# Patient Record
Sex: MALE | Race: WHITE | NOT HISPANIC OR LATINO | Employment: OTHER | URBAN - METROPOLITAN AREA
[De-identification: names, ages, dates, MRNs, and addresses within clinical notes are randomized per-mention and may not be internally consistent; named-entity substitution may affect disease eponyms.]

---

## 2023-10-12 ENCOUNTER — HOSPITAL ENCOUNTER (OUTPATIENT)
Dept: CARDIOLOGY | Facility: HOSPITAL | Age: 88
Discharge: HOME | End: 2023-10-12
Payer: MEDICARE

## 2023-10-12 DIAGNOSIS — F03.918 UNSPECIFIED DEMENTIA, UNSPECIFIED SEVERITY, WITH OTHER BEHAVIORAL DISTURBANCE (MULTI): ICD-10-CM

## 2023-10-12 PROCEDURE — 93010 ELECTROCARDIOGRAM REPORT: CPT | Performed by: INTERNAL MEDICINE

## 2023-10-12 PROCEDURE — 93005 ELECTROCARDIOGRAM TRACING: CPT

## 2023-10-24 LAB
ATRIAL RATE: 64 BPM
P AXIS: 69 DEGREES
P OFFSET: 191 MS
P ONSET: 137 MS
PR INTERVAL: 166 MS
Q ONSET: 220 MS
QRS COUNT: 10 BEATS
QRS DURATION: 76 MS
QT INTERVAL: 404 MS
QTC CALCULATION(BAZETT): 416 MS
QTC FREDERICIA: 412 MS
R AXIS: -40 DEGREES
T AXIS: 74 DEGREES
T OFFSET: 422 MS
VENTRICULAR RATE: 64 BPM

## 2023-11-13 ENCOUNTER — TELEPHONE (OUTPATIENT)
Dept: GERIATRIC MEDICINE | Facility: CLINIC | Age: 88
End: 2023-11-13
Payer: MEDICARE

## 2023-11-14 NOTE — TELEPHONE ENCOUNTER
Spoke with wife and let her know.  She also inquired about the pt's Memantine Rx that cost $200 per 90 days at [facility].  I printed out a Good Rx coupon for her for $32.99 for 90 days and will put in the mail to them.

## 2023-12-04 ENCOUNTER — TELEMEDICINE (OUTPATIENT)
Dept: BEHAVIORAL HEALTH | Facility: CLINIC | Age: 88
End: 2023-12-04
Payer: MEDICARE

## 2023-12-04 DIAGNOSIS — F02.80 MIXED ALZHEIMER'S AND VASCULAR DEMENTIA (MULTI): ICD-10-CM

## 2023-12-04 DIAGNOSIS — G30.9 MIXED ALZHEIMER'S AND VASCULAR DEMENTIA (MULTI): ICD-10-CM

## 2023-12-04 DIAGNOSIS — F01.50 MIXED ALZHEIMER'S AND VASCULAR DEMENTIA (MULTI): ICD-10-CM

## 2023-12-04 PROCEDURE — 99214 OFFICE O/P EST MOD 30 MIN: CPT | Performed by: PSYCHIATRY & NEUROLOGY

## 2023-12-04 RX ORDER — TAMSULOSIN HYDROCHLORIDE 0.4 MG/1
0.4 CAPSULE ORAL 2 TIMES DAILY
COMMUNITY
Start: 2023-08-02

## 2023-12-04 RX ORDER — RIVASTIGMINE TARTRATE 1.5 MG/1
1.5 CAPSULE ORAL 2 TIMES DAILY
Qty: 180 CAPSULE | Refills: 0 | Status: SHIPPED | OUTPATIENT
Start: 2023-12-04 | End: 2024-01-25 | Stop reason: SDUPTHER

## 2023-12-04 RX ORDER — AMLODIPINE AND BENAZEPRIL HYDROCHLORIDE 5; 10 MG/1; MG/1
1 CAPSULE ORAL DAILY
COMMUNITY
Start: 2021-09-10

## 2023-12-04 RX ORDER — MEMANTINE HYDROCHLORIDE 10 MG/1
10 TABLET ORAL 2 TIMES DAILY
Qty: 180 TABLET | Refills: 1 | Status: SHIPPED | OUTPATIENT
Start: 2023-12-04 | End: 2024-01-25 | Stop reason: SDUPTHER

## 2023-12-04 RX ORDER — MEMANTINE HYDROCHLORIDE 10 MG/1
10 TABLET ORAL 2 TIMES DAILY
COMMUNITY
Start: 2021-05-25 | End: 2023-12-04 | Stop reason: SDUPTHER

## 2023-12-04 RX ORDER — LEVOTHYROXINE SODIUM 25 UG/1
25 TABLET ORAL
COMMUNITY
Start: 2023-01-13

## 2023-12-04 SDOH — ECONOMIC STABILITY: FOOD INSECURITY: WITHIN THE PAST 12 MONTHS, YOU WORRIED THAT YOUR FOOD WOULD RUN OUT BEFORE YOU GOT MONEY TO BUY MORE.: NEVER TRUE

## 2023-12-04 SDOH — ECONOMIC STABILITY: FOOD INSECURITY: WITHIN THE PAST 12 MONTHS, THE FOOD YOU BOUGHT JUST DIDN'T LAST AND YOU DIDN'T HAVE MONEY TO GET MORE.: NEVER TRUE

## 2023-12-04 SDOH — HEALTH STABILITY: PHYSICAL HEALTH: ON AVERAGE, HOW MANY DAYS PER WEEK DO YOU ENGAGE IN MODERATE TO STRENUOUS EXERCISE (LIKE A BRISK WALK)?: 7 DAYS

## 2023-12-04 SDOH — HEALTH STABILITY: PHYSICAL HEALTH: ON AVERAGE, HOW MANY MINUTES DO YOU ENGAGE IN EXERCISE AT THIS LEVEL?: 10 MIN

## 2023-12-04 SDOH — ECONOMIC STABILITY: INCOME INSECURITY: IN THE LAST 12 MONTHS, WAS THERE A TIME WHEN YOU WERE NOT ABLE TO PAY THE MORTGAGE OR RENT ON TIME?: NO

## 2023-12-04 SDOH — ECONOMIC STABILITY: HOUSING INSECURITY
IN THE LAST 12 MONTHS, WAS THERE A TIME WHEN YOU DID NOT HAVE A STEADY PLACE TO SLEEP OR SLEPT IN A SHELTER (INCLUDING NOW)?: NO

## 2023-12-04 SDOH — ECONOMIC STABILITY: GENERAL: WHICH OF THE FOLLOWING DO YOU KNOW HOW TO USE AND HAVE ACCESS TO EVERY DAY? (CHOOSE ALL THAT APPLY): NONE OF THESE

## 2023-12-04 SDOH — ECONOMIC STABILITY: HOUSING INSECURITY: IN THE LAST 12 MONTHS, HOW MANY PLACES HAVE YOU LIVED?: 1

## 2023-12-04 SDOH — ECONOMIC STABILITY: TRANSPORTATION INSECURITY
IN THE PAST 12 MONTHS, HAS THE LACK OF TRANSPORTATION KEPT YOU FROM MEDICAL APPOINTMENTS OR FROM GETTING MEDICATIONS?: NO

## 2023-12-04 SDOH — ECONOMIC STABILITY: GENERAL
WHICH OF THE FOLLOWING WOULD YOU LIKE TO GET CONNECTED TO IN ORDER TO RECEIVE A DISCOUNT OR FOR FREE? (CHOOSE ALL THAT APPLY): NONE OF THESE

## 2023-12-04 SDOH — ECONOMIC STABILITY: TRANSPORTATION INSECURITY
IN THE PAST 12 MONTHS, HAS LACK OF TRANSPORTATION KEPT YOU FROM MEETINGS, WORK, OR FROM GETTING THINGS NEEDED FOR DAILY LIVING?: NO

## 2023-12-04 ASSESSMENT — SOCIAL DETERMINANTS OF HEALTH (SDOH)
WITHIN THE LAST YEAR, HAVE YOU BEEN KICKED, HIT, SLAPPED, OR OTHERWISE PHYSICALLY HURT BY YOUR PARTNER OR EX-PARTNER?: NO
HOW OFTEN DO YOU ATTENT MEETINGS OF THE CLUB OR ORGANIZATION YOU BELONG TO?: NEVER
WITHIN THE LAST YEAR, HAVE YOU BEEN AFRAID OF YOUR PARTNER OR EX-PARTNER?: NO
HOW HARD IS IT FOR YOU TO PAY FOR THE VERY BASICS LIKE FOOD, HOUSING, MEDICAL CARE, AND HEATING?: NOT HARD AT ALL
IN THE PAST 12 MONTHS, HAS THE ELECTRIC, GAS, OIL, OR WATER COMPANY THREATENED TO SHUT OFF SERVICE IN YOUR HOME?: NO
WITHIN THE LAST YEAR, HAVE TO BEEN RAPED OR FORCED TO HAVE ANY KIND OF SEXUAL ACTIVITY BY YOUR PARTNER OR EX-PARTNER?: NO
DO YOU BELONG TO ANY CLUBS OR ORGANIZATIONS SUCH AS CHURCH GROUPS UNIONS, FRATERNAL OR ATHLETIC GROUPS, OR SCHOOL GROUPS?: NO
HOW OFTEN DO YOU ATTEND CHURCH OR RELIGIOUS SERVICES?: NEVER
HOW OFTEN DO YOU GET TOGETHER WITH FRIENDS OR RELATIVES?: MORE THAN THREE TIMES A WEEK
WITHIN THE LAST YEAR, HAVE YOU BEEN HUMILIATED OR EMOTIONALLY ABUSED IN OTHER WAYS BY YOUR PARTNER OR EX-PARTNER?: NO
IN A TYPICAL WEEK, HOW MANY TIMES DO YOU TALK ON THE PHONE WITH FAMILY, FRIENDS, OR NEIGHBORS?: MORE THAN THREE TIMES A WEEK

## 2023-12-04 ASSESSMENT — LIFESTYLE VARIABLES
SKIP TO QUESTIONS 9-10: 1
HOW MANY STANDARD DRINKS CONTAINING ALCOHOL DO YOU HAVE ON A TYPICAL DAY: PATIENT DOES NOT DRINK
HOW OFTEN DO YOU HAVE SIX OR MORE DRINKS ON ONE OCCASION: NEVER
AUDIT-C TOTAL SCORE: 0
HOW OFTEN DO YOU HAVE A DRINK CONTAINING ALCOHOL: NEVER

## 2023-12-04 ASSESSMENT — ENCOUNTER SYMPTOMS
NEUROLOGICAL NEGATIVE: 1
PSYCHIATRIC NEGATIVE: 1

## 2023-12-04 ASSESSMENT — PATIENT HEALTH QUESTIONNAIRE - PHQ9
SUM OF ALL RESPONSES TO PHQ9 QUESTIONS 1 & 2: 0
2. FEELING DOWN, DEPRESSED OR HOPELESS: NOT AT ALL
1. LITTLE INTEREST OR PLEASURE IN DOING THINGS: NOT AT ALL

## 2023-12-04 NOTE — PROGRESS NOTES
Subjective   Patient ID: Jarred Rodrigues is a 89 y.o. male who presents for No chief complaint on file. I am doing good.    The patient is alert, fully oriented, language is intact, with recent and remote memory loss. The patient denies any suicidal or homicidal ideation or plans. The patient presents with no depressive, manic or psychotic symptoms. Thought is impoverished. Judgment and insight are impaired. No behavioral disturbances are present on examination.        Review of Systems   Neurological: Negative.         The patient is alert, fully oriented, language is intact, with recent and remote memory loss. The patient denies any suicidal or homicidal ideation or plans. The patient presents with no depressive, manic or psychotic symptoms. Thought is impoverished. Judgment and insight are impaired. No behavioral disturbances are present on examination.     Psychiatric/Behavioral: Negative.          The patient is alert, fully oriented, language is intact, with recent and remote memory loss. The patient denies any suicidal or homicidal ideation or plans. The patient presents with no depressive, manic or psychotic symptoms. Thought is impoverished. Judgment and insight are impaired. No behavioral disturbances are present on examination.     All other systems reviewed and are negative.      Objective   Physical Exam  Neurological:      General: No focal deficit present.      Mental Status: He is alert. Mental status is at baseline.      Comments: The patient is alert, fully oriented, language is intact, with recent and remote memory loss. The patient denies any suicidal or homicidal ideation or plans. The patient presents with no depressive, manic or psychotic symptoms. Thought is impoverished. Judgment and insight are impaired. No behavioral disturbances are present on examination.     Psychiatric:      Comments: The patient is alert, fully oriented, language is intact, with recent and remote memory loss. The  patient denies any suicidal or homicidal ideation or plans. The patient presents with no depressive, manic or psychotic symptoms. Thought is impoverished. Judgment and insight are impaired. No behavioral disturbances are present on examination.           Lab Review:       Assessment/Plan   The risks, benefits & alternatives to the medications prescribed were explained to you and your support person, your wife, today. You & your support person were able to understand & repeat these risks, benefits & alternatives to this prescribed medication. You and your support person, your wife, have agreed to proceed with treatment with the medications discussed based on the conclusion that the benefit outweighs the risks of this treatment regimen: continue memantine 10 mg twice daily and start rivastigmine 1.5 mg twice daily with food after having reviewed your EKG from October of 2024 which showed a left axis deviation and pulse of 69. Follow up in January of 2024 and sooner if needed as discussed.

## 2024-01-25 ENCOUNTER — TELEMEDICINE (OUTPATIENT)
Dept: BEHAVIORAL HEALTH | Facility: CLINIC | Age: 89
End: 2024-01-25
Payer: MEDICARE

## 2024-01-25 DIAGNOSIS — G30.9 MIXED ALZHEIMER'S AND VASCULAR DEMENTIA (MULTI): ICD-10-CM

## 2024-01-25 DIAGNOSIS — F02.80 MIXED ALZHEIMER'S AND VASCULAR DEMENTIA (MULTI): ICD-10-CM

## 2024-01-25 DIAGNOSIS — F01.50 MIXED ALZHEIMER'S AND VASCULAR DEMENTIA (MULTI): ICD-10-CM

## 2024-01-25 PROCEDURE — 99213 OFFICE O/P EST LOW 20 MIN: CPT | Performed by: PSYCHIATRY & NEUROLOGY

## 2024-01-25 RX ORDER — RIVASTIGMINE TARTRATE 1.5 MG/1
1.5 CAPSULE ORAL 2 TIMES DAILY
Qty: 180 CAPSULE | Refills: 1 | Status: SHIPPED | OUTPATIENT
Start: 2024-01-25 | End: 2024-07-23

## 2024-01-25 RX ORDER — MEMANTINE HYDROCHLORIDE 10 MG/1
10 TABLET ORAL 2 TIMES DAILY
Qty: 180 TABLET | Refills: 1 | Status: SHIPPED | OUTPATIENT
Start: 2024-01-25 | End: 2024-03-25 | Stop reason: SDUPTHER

## 2024-01-25 SDOH — ECONOMIC STABILITY: GENERAL
WHICH OF THE FOLLOWING DO YOU KNOW HOW TO USE AND HAVE ACCESS TO EVERY DAY? (CHOOSE ALL THAT APPLY): DESKTOP COMPUTER, LAPTOP COMPUTER, OR TABLET WITH BROADBAND INTERNET CONNECTION;SMARTPHONE WITH CELLULAR DATA PLAN

## 2024-01-25 ASSESSMENT — SOCIAL DETERMINANTS OF HEALTH (SDOH)
IN A TYPICAL WEEK, HOW MANY TIMES DO YOU TALK ON THE PHONE WITH FAMILY, FRIENDS, OR NEIGHBORS?: MORE THAN THREE TIMES A WEEK
DO YOU BELONG TO ANY CLUBS OR ORGANIZATIONS SUCH AS CHURCH GROUPS UNIONS, FRATERNAL OR ATHLETIC GROUPS, OR SCHOOL GROUPS?: NO
HOW OFTEN DO YOU GET TOGETHER WITH FRIENDS OR RELATIVES?: MORE THAN THREE TIMES A WEEK
HOW OFTEN DO YOU ATTENT MEETINGS OF THE CLUB OR ORGANIZATION YOU BELONG TO?: 1 TO 4 TIMES PER YEAR
HOW OFTEN DO YOU ATTEND CHURCH OR RELIGIOUS SERVICES?: 1 TO 4 TIMES PER YEAR

## 2024-01-25 NOTE — PROGRESS NOTES
Subjective   Patient ID: Jarred Rodrigues is a 89 y.o. male who presents for No chief complaint on file.     The patient is alert, only oriented to self and situation, language is impoverished. The patient presents with recent and remote memory lost. The patient denies any suicidal or homicidal ideation or plans. The patient presents with no depressive, manic or psychotic symptoms. Thought is impaired. Judgment  and insight are severely impaired. Behavioral disturbances are largely evidenced by confusion with severe decline in executive and cognitive function. His wife reports that he has been sleeping a large part of the day.         Review of Systems   Neurological:         The patient is alert, only oriented to self and situation, language is impoverished. The patient presents with recent and remote memory lost. The patient denies any suicidal or homicidal ideation or plans. The patient presents with no depressive, manic or psychotic symptoms. Thought is impaired. Judgment  and insight are severely impaired. Behavioral disturbances are largely evidenced by confusion with severe decline in executive and cognitive function. His wife reports that he has been sleeping a large part of the day.      Psychiatric/Behavioral:          The patient is alert, only oriented to self and situation, language is impoverished. The patient presents with recent and remote memory lost. The patient denies any suicidal or homicidal ideation or plans. The patient presents with no depressive, manic or psychotic symptoms. Thought is impaired. Judgment  and insight are severely impaired. Behavioral disturbances are largely evidenced by confusion with severe decline in executive and cognitive function. His wife reports that he has been sleeping a large part of the day.      All other systems reviewed and are negative.      Objective   Physical Exam  Neurological:      General: No focal deficit present.      Mental Status: He is alert.       Comments: The patient is alert, only oriented to self and situation, language is impoverished. The patient presents with recent and remote memory lost. The patient denies any suicidal or homicidal ideation or plans. The patient presents with no depressive, manic or psychotic symptoms. Thought is impaired. Judgment  and insight are severely impaired. Behavioral disturbances are largely evidenced by confusion with severe decline in executive and cognitive function. His wife reports that he has been sleeping a large part of the day.      Psychiatric:      Comments: The patient is alert, only oriented to self and situation, language is impoverished. The patient presents with recent and remote memory lost. The patient denies any suicidal or homicidal ideation or plans. The patient presents with no depressive, manic or psychotic symptoms. Thought is impaired. Judgment  and insight are severely impaired. Behavioral disturbances are largely evidenced by confusion with severe decline in executive and cognitive function. His wife reports that he has been sleeping a large part of the day.            Lab Review:       Assessment/Plan   The FDA risks, benefits & alternatives to the medications prescribed were explained to you and your support person, your wife, today. You & your support person were able to understand & repeat these risks, benefits & alternatives to these prescribed medications. You and your support person have agreed to proceed with treatment with the medications discussed based on the conclusion that the benefit outweighs the risks of this treatment regimen: continue rivastigmine 1.5 mg twice daily and memantine 10 mg twice daily. Follow up in June of 2024.

## 2024-03-25 ENCOUNTER — TELEMEDICINE (OUTPATIENT)
Dept: BEHAVIORAL HEALTH | Facility: CLINIC | Age: 89
End: 2024-03-25
Payer: MEDICARE

## 2024-03-25 DIAGNOSIS — F02.80 MIXED ALZHEIMER'S AND VASCULAR DEMENTIA (MULTI): ICD-10-CM

## 2024-03-25 DIAGNOSIS — G30.9 MIXED ALZHEIMER'S AND VASCULAR DEMENTIA (MULTI): ICD-10-CM

## 2024-03-25 DIAGNOSIS — F01.50 MIXED ALZHEIMER'S AND VASCULAR DEMENTIA (MULTI): ICD-10-CM

## 2024-03-25 PROCEDURE — 99213 OFFICE O/P EST LOW 20 MIN: CPT | Performed by: PSYCHIATRY & NEUROLOGY

## 2024-03-25 PROCEDURE — 1157F ADVNC CARE PLAN IN RCRD: CPT | Performed by: PSYCHIATRY & NEUROLOGY

## 2024-03-25 PROCEDURE — 1159F MED LIST DOCD IN RCRD: CPT | Performed by: PSYCHIATRY & NEUROLOGY

## 2024-03-25 RX ORDER — MEMANTINE HYDROCHLORIDE 10 MG/1
10 TABLET ORAL 2 TIMES DAILY
Qty: 180 TABLET | Refills: 1 | Status: SHIPPED | OUTPATIENT
Start: 2024-03-25 | End: 2024-09-21

## 2024-03-25 NOTE — PROGRESS NOTES
Subjective   Patient ID: Jarred Rodrigues is a 89 y.o. male who presents for No chief complaint on file. I am alright.    The patient engaged in a telehealth session via Epic audio visual or phone with this provider practicing within the [facility]. The identity of the patient was verified by their date of birth and last four digits of their social security number. The provider demonstrated that confidentially was preserved at their location. The patient was informed that they were responsible for ensuring confidentially was secured at their location. The patient's location was documented for emergency purposes. The patient was informed of the necessary steps that would occur if an emergency was to occur or technology failed during session.     The patient is alert, only oriented to self and situation, language is impoverished. The patient presents with recent and remote memory lost. The patient denies any suicidal or homicidal ideation or plans. The patient presents with no depressive, manic or psychotic symptoms. Thought is impaired. Judgment  and insight are severely impaired. Behavioral disturbances are largely evidenced by confusion with severe decline in executive and cognitive function. His wife reports that he has been sleeping a large part of the day.         Review of Systems   Neurological:         The patient is alert, only oriented to self and situation, language is impoverished. The patient presents with recent and remote memory lost. The patient denies any suicidal or homicidal ideation or plans. The patient presents with no depressive, manic or psychotic symptoms. Thought is impaired. Judgment  and insight are severely impaired. Behavioral disturbances are largely evidenced by confusion with severe decline in executive and cognitive function. His wife reports that he has been sleeping a large part of the day.      Psychiatric/Behavioral:          The patient is alert, only oriented to self and  situation, language is impoverished. The patient presents with recent and remote memory lost. The patient denies any suicidal or homicidal ideation or plans. The patient presents with no depressive, manic or psychotic symptoms. Thought is impaired. Judgment  and insight are severely impaired. Behavioral disturbances are largely evidenced by confusion with severe decline in executive and cognitive function. His wife reports that he has been sleeping a large part of the day.      All other systems reviewed and are negative.      Objective   Physical Exam  Neurological:      General: No focal deficit present.      Mental Status: He is alert.      Comments: The patient is alert, only oriented to self and situation, language is impoverished. The patient presents with recent and remote memory lost. The patient denies any suicidal or homicidal ideation or plans. The patient presents with no depressive, manic or psychotic symptoms. Thought is impaired. Judgment  and insight are severely impaired. Behavioral disturbances are largely evidenced by confusion with severe decline in executive and cognitive function. His wife reports that he has been sleeping a large part of the day.      Psychiatric:      Comments: The patient is alert, only oriented to self and situation, language is impoverished. The patient presents with recent and remote memory lost. The patient denies any suicidal or homicidal ideation or plans. The patient presents with no depressive, manic or psychotic symptoms. Thought is impaired. Judgment  and insight are severely impaired. Behavioral disturbances are largely evidenced by confusion with severe decline in executive and cognitive function. His wife reports that he has been sleeping a large part of the day.            Lab Review:       Assessment/Plan   The FDA risks, benefits & alternatives to the medications prescribed were explained to you and your support person, your wife, today. You & your support  person were able to understand & repeat these risks, benefits & alternatives to these prescribed medications. You and your support person have agreed to proceed with treatment with the medications discussed based on the conclusion that the benefit outweighs the risks of this treatment regimen: you have stopped rivastigmine 1.5 mg twice daily secondary to diarrhea secondary to hospice recommendation but you are continuing memantine 10 mg twice daily.     Follow up in June of 2024.

## 2024-04-10 ENCOUNTER — TELEPHONE (OUTPATIENT)
Dept: BEHAVIORAL HEALTH | Facility: CLINIC | Age: 89
End: 2024-04-10
Payer: MEDICARE

## 2024-04-12 ENCOUNTER — HOSPITAL ENCOUNTER (OUTPATIENT)
Dept: CARDIOLOGY | Facility: CLINIC | Age: 89
Discharge: HOME | End: 2024-04-12
Payer: MEDICARE

## 2024-04-12 DIAGNOSIS — I49.5 SICK SINUS SYNDROME (MULTI): ICD-10-CM

## 2024-04-12 DIAGNOSIS — Z95.0 PRESENCE OF CARDIAC PACEMAKER: ICD-10-CM

## 2024-04-12 PROCEDURE — 93296 REM INTERROG EVL PM/IDS: CPT

## 2024-04-22 ENCOUNTER — APPOINTMENT (OUTPATIENT)
Dept: BEHAVIORAL HEALTH | Facility: CLINIC | Age: 89
End: 2024-04-22
Payer: MEDICARE

## 2024-06-20 ENCOUNTER — APPOINTMENT (OUTPATIENT)
Dept: BEHAVIORAL HEALTH | Facility: CLINIC | Age: 89
End: 2024-06-20
Payer: MEDICARE

## 2024-06-21 ENCOUNTER — APPOINTMENT (OUTPATIENT)
Dept: BEHAVIORAL HEALTH | Facility: CLINIC | Age: 89
End: 2024-06-21
Payer: MEDICARE

## 2024-09-11 ENCOUNTER — HOSPITAL ENCOUNTER (OUTPATIENT)
Dept: CARDIOLOGY | Facility: CLINIC | Age: 89
Discharge: HOME | End: 2024-09-11
Payer: MEDICARE

## 2024-09-11 DIAGNOSIS — Z95.0 PRESENCE OF CARDIAC PACEMAKER: ICD-10-CM

## 2024-09-11 DIAGNOSIS — I49.5 SICK SINUS SYNDROME (MULTI): ICD-10-CM

## 2024-09-11 PROCEDURE — 93296 REM INTERROG EVL PM/IDS: CPT
